# Patient Record
Sex: MALE | Race: BLACK OR AFRICAN AMERICAN | NOT HISPANIC OR LATINO | Employment: FULL TIME | URBAN - METROPOLITAN AREA
[De-identification: names, ages, dates, MRNs, and addresses within clinical notes are randomized per-mention and may not be internally consistent; named-entity substitution may affect disease eponyms.]

---

## 2023-10-21 PROBLEM — S86.011A ACHILLES TENDON RUPTURE, RIGHT, INITIAL ENCOUNTER: Status: ACTIVE | Noted: 2023-10-21

## 2023-10-21 PROBLEM — K40.90 REDUCIBLE RIGHT INGUINAL HERNIA: Status: ACTIVE | Noted: 2023-10-21

## 2023-10-21 PROBLEM — R30.0 DYSURIA: Status: ACTIVE | Noted: 2023-10-21

## 2023-10-21 RX ORDER — ASPIRIN 81 MG/1
81 TABLET ORAL DAILY
COMMUNITY

## 2023-10-21 RX ORDER — LISINOPRIL 30 MG/1
TABLET ORAL
COMMUNITY

## 2023-10-24 ENCOUNTER — EVALUATION (OUTPATIENT)
Dept: PHYSICAL THERAPY | Facility: CLINIC | Age: 43
End: 2023-10-24
Payer: OTHER MISCELLANEOUS

## 2023-10-24 DIAGNOSIS — S39.012A STRAIN OF MUSCLE, FASCIA AND TENDON OF LOWER BACK, INITIAL ENCOUNTER: Primary | ICD-10-CM

## 2023-10-24 DIAGNOSIS — S86.011D STRAIN OF ACHILLES TENDON, RIGHT, SUBSEQUENT ENCOUNTER: ICD-10-CM

## 2023-10-24 DIAGNOSIS — S86.912D STRAIN OF KNEE, LEFT, SUBSEQUENT ENCOUNTER: ICD-10-CM

## 2023-10-24 DIAGNOSIS — S39.012D BACK STRAIN, SUBSEQUENT ENCOUNTER: ICD-10-CM

## 2023-10-24 DIAGNOSIS — S86.011A STRAIN OF RIGHT ACHILLES TENDON, INITIAL ENCOUNTER: ICD-10-CM

## 2023-10-24 DIAGNOSIS — S83.92XA SPRAIN OF UNSPECIFIED SITE OF LEFT KNEE, INITIAL ENCOUNTER: ICD-10-CM

## 2023-10-24 DIAGNOSIS — S66.912A STRAIN OF UNSPECIFIED MUSCLE, FASCIA AND TENDON AT WRIST AND HAND LEVEL, LEFT HAND, INITIAL ENCOUNTER: ICD-10-CM

## 2023-10-24 PROCEDURE — 97110 THERAPEUTIC EXERCISES: CPT | Mod: GP

## 2023-10-24 PROCEDURE — 97161 PT EVAL LOW COMPLEX 20 MIN: CPT | Mod: GP

## 2023-10-24 PROCEDURE — 97140 MANUAL THERAPY 1/> REGIONS: CPT | Mod: GP

## 2023-10-24 ASSESSMENT — PATIENT HEALTH QUESTIONNAIRE - PHQ9
SUM OF ALL RESPONSES TO PHQ9 QUESTIONS 1 AND 2: 0
1. LITTLE INTEREST OR PLEASURE IN DOING THINGS: NOT AT ALL
2. FEELING DOWN, DEPRESSED OR HOPELESS: NOT AT ALL
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
## 2023-10-24 NOTE — Clinical Note
October 24, 2023     Patient: Elbert Tam   YOB: 1980   Date of Visit: 10/24/2023       To Whom It May Concern:    It is my medical opinion that Elbert Tam {Work release (duty restriction):89839}.    If you have any questions or concerns, please don't hesitate to call.         Sincerely,        Miranda Mondragon, PT    CC: No Recipients

## 2023-10-24 NOTE — Clinical Note
October 24, 2023     Patient: Elbert Tam   YOB: 1980   Date of Visit: 10/24/2023       To Whom it May Concern:    Elbert Tam was seen in my clinic on 10/24/2023. He {Return to school/sport:98041}.    If you have any questions or concerns, please don't hesitate to call.         Sincerely,          Miranda Mondragon, PT        CC: No Recipients

## 2023-10-26 ENCOUNTER — TREATMENT (OUTPATIENT)
Dept: PHYSICAL THERAPY | Facility: CLINIC | Age: 43
End: 2023-10-26
Payer: OTHER MISCELLANEOUS

## 2023-10-26 DIAGNOSIS — S86.011A STRAIN OF RIGHT ACHILLES TENDON, INITIAL ENCOUNTER: ICD-10-CM

## 2023-10-26 DIAGNOSIS — S66.912A STRAIN OF UNSPECIFIED MUSCLE, FASCIA AND TENDON AT WRIST AND HAND LEVEL, LEFT HAND, INITIAL ENCOUNTER: ICD-10-CM

## 2023-10-26 DIAGNOSIS — S86.912D STRAIN OF KNEE, LEFT, SUBSEQUENT ENCOUNTER: Primary | ICD-10-CM

## 2023-10-26 DIAGNOSIS — S39.012A STRAIN OF MUSCLE, FASCIA AND TENDON OF LOWER BACK, INITIAL ENCOUNTER: ICD-10-CM

## 2023-10-26 DIAGNOSIS — S83.92XA SPRAIN OF UNSPECIFIED SITE OF LEFT KNEE, INITIAL ENCOUNTER: ICD-10-CM

## 2023-10-26 DIAGNOSIS — S39.012D BACK STRAIN, SUBSEQUENT ENCOUNTER: ICD-10-CM

## 2023-10-26 DIAGNOSIS — S86.011D STRAIN OF ACHILLES TENDON, RIGHT, SUBSEQUENT ENCOUNTER: ICD-10-CM

## 2023-10-26 PROCEDURE — 97140 MANUAL THERAPY 1/> REGIONS: CPT | Mod: GP

## 2023-10-26 PROCEDURE — 97110 THERAPEUTIC EXERCISES: CPT | Mod: GP

## 2023-10-26 NOTE — PROGRESS NOTES
"Physical Therapy      Physical Therapy Treatment    Patient Name: Elbert Tam  MRN: [id number]  Today's Date: 10/26/2023  Time Calculation  Start Time: 1000  Stop Time: 1045  Time Calculation (min): 45 min    INSURANCE    Workers Comp  Visit #: 2  Approved # of Visits: 12  Cert Dates Start  ASAP  Cert Date End 11/15/23  Referred by KEYLA George     Current Problem  1. Strain of knee, left, subsequent encounter        2. Strain of muscle, fascia and tendon of lower back, initial encounter  Follow Up In Physical Therapy      3. Strain of right Achilles tendon, initial encounter  Follow Up In Physical Therapy      4. Sprain of unspecified site of left knee, initial encounter  Follow Up In Physical Therapy      5. Strain of unspecified muscle, fascia and tendon at wrist and hand level, left hand, initial encounter  Follow Up In Physical Therapy      6. Strain of Achilles tendon, right, subsequent encounter        7. Back strain, subsequent encounter            Assessment  Demonstrates decreased tenderness and TP throughout lumbar paraspinals and piriformis. Does note relief with manual therapy and a feeling of \"looseness.\" Improvements noted in right hip IR in prone as well. Significant weakness with right gastroc, with inability to complete SL heel raise. He also demonstrates decreased coordination throughout his core. Poor TA activation with verbal / tactile cues for engagement of TA. Fatigued with all exercises. No increase in pain with exercises.       PLAN  Continue with manual for symptom management. Flexibility for symptom management. Focus on TA / anterior / posterior core strength, hip strength, gastroc / soleus strength.     SUBJECTIVE    Reports that overall his back is feeling better. He is doing his exercises every day. Reports that he likes the stretches, and they seem to be loosening him up.     Pain:  Current: 4/10     Precautions: HTN, Achielles Repair 7/1/23  Fall risk - none      OBJECTIVE " "    Treatment  Manual 25 min   - STM to lumbar paraspinals  - pin and stretch left piriformis  - STM to achielles  - IASTM to achielles incision      Therapeutic Exercise 20 min   - calf stretch slant board 3 x 30\"  - B LE calf raise with SL (R) eccentric lower x 20   - SL Squat Shuttle 75 lbs (R) x 20   - SL Heel Raise Shuttle 50 lbs (R) x 20     - alt UE / LE over ball x 20     - LTR with ball squeeze w/ feet elevated x 20   - dead bug x 20 (cues to prevent doming of core)  - TA + SLR lower x 10 (cues to prevent doming of core)    "

## 2023-10-31 ENCOUNTER — TREATMENT (OUTPATIENT)
Dept: PHYSICAL THERAPY | Facility: CLINIC | Age: 43
End: 2023-10-31
Payer: OTHER MISCELLANEOUS

## 2023-10-31 DIAGNOSIS — S86.011D STRAIN OF ACHILLES TENDON, RIGHT, SUBSEQUENT ENCOUNTER: ICD-10-CM

## 2023-10-31 DIAGNOSIS — S86.912D STRAIN OF KNEE, LEFT, SUBSEQUENT ENCOUNTER: ICD-10-CM

## 2023-10-31 DIAGNOSIS — S39.012D BACK STRAIN, SUBSEQUENT ENCOUNTER: Primary | ICD-10-CM

## 2023-10-31 PROCEDURE — 97140 MANUAL THERAPY 1/> REGIONS: CPT | Mod: GP | Performed by: PHYSICAL THERAPIST

## 2023-10-31 PROCEDURE — 97110 THERAPEUTIC EXERCISES: CPT | Mod: GP | Performed by: PHYSICAL THERAPIST

## 2023-10-31 NOTE — PROGRESS NOTES
"Physical Therapy      Physical Therapy Treatment    Patient Name: Elbert Tam  MRN: [id number]  Today's Date: 10/31/2023  Time Calculation  Start Time: 0845  Stop Time: 0930  Time Calculation (min): 45 min    INSURANCE  Workers Comp  Visit #: 3  Approved # of Visits: 12  Cert Dates Start  ASAP  Cert Date End 11/15/23  Referred by KEYLA George     Current Problem  1. Back strain, subsequent encounter        2. Strain of Achilles tendon, right, subsequent encounter        3. Strain of knee, left, subsequent encounter              Assessment  Pt challenged with side stepping, monster walks, and dead bugs with notable fatigue in core and glutes. Pt tolerated manual well with decreased TTP in lumbar paraspinals and increased IR ROM during pin and stretch. Pt is appropriate for continued skilled PT services to address remaining impairments.      PLAN  Continue core, LE, and achilles strengthening. Progress as tolerated.   Consider jumping to build gastroc strength as pt was not aware he could resume to jumping.   Continue with manual and flexibility for sx management.    SUBJECTIVE    Pt states that he still feels he is improving. Was sore after last session, but states it was a good muscle soreness. Compliant with HEP.    Pain:  Current: 3/10     Precautions: HTN, Achielles Repair 7/1/2  Fall risk - none      OBJECTIVE     Treatment  Manual 15 min   - DTM and TPR to lumbar paraspinals  - pin and stretch left piriformis  - STM to achilles    Therapeutic Exercise 30 min   - SCIFIT L3 x 8\" seat 12   - side stepping green TB at ankles 25' x 3 R/L*  - monster walks green TB at ankles 25' x 3 fwd/bwd*  - calf stretch slant board 30\" x 3  - B LE calf raise with SL (R) eccentric lower x 10 x 2   - TA + BKFO x 10 R/L   - TA + alt marches x 10 R/L   - dead bug with SB x 10 R/L  - bird dog x 10 R/L  *added to HEP    Education  Pt educated on abdominal musculature anatomy and importance of TA strength for spinal " stability, resuming jumping to build up R gastroc strength, and HEP updated.

## 2023-11-02 ENCOUNTER — TREATMENT (OUTPATIENT)
Dept: PHYSICAL THERAPY | Facility: CLINIC | Age: 43
End: 2023-11-02
Payer: OTHER MISCELLANEOUS

## 2023-11-02 DIAGNOSIS — S39.012A STRAIN OF MUSCLE, FASCIA AND TENDON OF LOWER BACK, INITIAL ENCOUNTER: ICD-10-CM

## 2023-11-02 DIAGNOSIS — S83.92XA SPRAIN OF UNSPECIFIED SITE OF LEFT KNEE, INITIAL ENCOUNTER: ICD-10-CM

## 2023-11-02 DIAGNOSIS — S86.011A STRAIN OF RIGHT ACHILLES TENDON, INITIAL ENCOUNTER: ICD-10-CM

## 2023-11-02 DIAGNOSIS — S86.011D STRAIN OF ACHILLES TENDON, RIGHT, SUBSEQUENT ENCOUNTER: ICD-10-CM

## 2023-11-02 DIAGNOSIS — S39.012D BACK STRAIN, SUBSEQUENT ENCOUNTER: Primary | ICD-10-CM

## 2023-11-02 DIAGNOSIS — S86.912D STRAIN OF KNEE, LEFT, SUBSEQUENT ENCOUNTER: ICD-10-CM

## 2023-11-02 DIAGNOSIS — S66.912A STRAIN OF UNSPECIFIED MUSCLE, FASCIA AND TENDON AT WRIST AND HAND LEVEL, LEFT HAND, INITIAL ENCOUNTER: ICD-10-CM

## 2023-11-02 PROCEDURE — 97140 MANUAL THERAPY 1/> REGIONS: CPT | Mod: GP

## 2023-11-02 PROCEDURE — 97110 THERAPEUTIC EXERCISES: CPT | Mod: GP

## 2023-11-02 NOTE — PROGRESS NOTES
"Physical Therapy      Physical Therapy Treatment    Patient Name: Elbert Tam  MRN: [id number]  Today's Date: 11/2/2023       INSURANCE  Workers Comp  Visit #: 4  Approved # of Visits: 12  Cert Dates Start  ASAP  Cert Date End 11/15/23  Referred by KEYLA George     Current Problem  1. Back strain, subsequent encounter        2. Strain of Achilles tendon, right, subsequent encounter        3. Strain of knee, left, subsequent encounter        4. Strain of muscle, fascia and tendon of lower back, initial encounter  Follow Up In Physical Therapy      5. Strain of right Achilles tendon, initial encounter  Follow Up In Physical Therapy      6. Sprain of unspecified site of left knee, initial encounter  Follow Up In Physical Therapy      7. Strain of unspecified muscle, fascia and tendon at wrist and hand level, left hand, initial encounter  Follow Up In Physical Therapy              Assessment  Patient demonstrates improvements in his core strength. Improved coordination with TA. Able to progress core strengthening activities, with cues for left side plank for alignment. Demonstrated difficulty with foam roll coordination activities. He was also able to initiate jumping without any complaints of pain, but fatigue noted. Advised patient he can start some light jumping at home x 30\" increments. Decreased TP to lumbar parapsinals and continued improvements in his hip IR ROM. Continue to progress core strengthening and flexibility as well as gastroc strength as able.       PLAN  Continue core, LE, and achilles strengthening. Progress as tolerated.   Consider jumping to build gastroc strength as pt was not aware he could resume to jumping.   Continue with manual and flexibility for sx management.    SUBJECTIVE    Reports that he is feeling pretty good. He feels like his back is improving. He is consistently doing his exercises at home.  Reports that rolling in bed is getting easier, but he is still having some pain " "occasionally.     Pain:  Current: 3/10     Precautions: HTN, Achielles Repair 7/1/2  Fall risk - none      OBJECTIVE       Treatment  Manual 10 min   - DTM and TPR to lumbar paraspinals  - pin and stretch left piriformis    Therapeutic Exercise 35 min   - SCIFIT L3 x 6\" seat 12   - side stepping green TB at ankles 25' x 3 R/L  - monster walks green TB at ankles 25' x 3 fwd/bwd  - waddles green TB at ankles 25' x 3 fwd/bwd  - full foam roll alt UE / LE 20 x   - full foam roll B UE hover + LE march 20 x   - TA + BKFO x 10 R/L   - TA + alt marches x 10 R/L   - down dog x 10\" - with alt LE ext to stretch achielles *   - emily pose x 30\" *  - emily pose + r/l SB x 30\"  - dead bug with SB x 10 R/L *  - modified side plank + clam 10 x ea *  - bird dog x 10 R/L *  - jumping shuttle 30 lbs 40 x   - jumping 2 x 30\" * (jump rope at home 3 -5 x 20-30\")    -*added to HEP    Education  Pt educated on jumping / running progression and ability to complete SL heel raise before resuming running. Educated regarding yoga   "

## 2023-11-06 ENCOUNTER — TREATMENT (OUTPATIENT)
Dept: PHYSICAL THERAPY | Facility: CLINIC | Age: 43
End: 2023-11-06
Payer: OTHER MISCELLANEOUS

## 2023-11-06 DIAGNOSIS — S86.011A STRAIN OF RIGHT ACHILLES TENDON, INITIAL ENCOUNTER: ICD-10-CM

## 2023-11-06 DIAGNOSIS — S66.912A STRAIN OF UNSPECIFIED MUSCLE, FASCIA AND TENDON AT WRIST AND HAND LEVEL, LEFT HAND, INITIAL ENCOUNTER: ICD-10-CM

## 2023-11-06 DIAGNOSIS — S83.92XA SPRAIN OF UNSPECIFIED SITE OF LEFT KNEE, INITIAL ENCOUNTER: ICD-10-CM

## 2023-11-06 DIAGNOSIS — S39.012A STRAIN OF MUSCLE, FASCIA AND TENDON OF LOWER BACK, INITIAL ENCOUNTER: ICD-10-CM

## 2023-11-06 PROCEDURE — 97140 MANUAL THERAPY 1/> REGIONS: CPT | Mod: GP,CQ

## 2023-11-06 PROCEDURE — 97110 THERAPEUTIC EXERCISES: CPT | Mod: GP,CQ

## 2023-11-06 NOTE — PROGRESS NOTES
"Physical Therapy      Physical Therapy Treatment    Patient Name: Elbert Tam  MRN: [id number]  Today's Date: 11/6/2023    Time Calculation  Start Time: 1135  Stop Time: 1220  Time Calculation (min): 45 min    INSURANCE  Workers Comp  Visit #: 5  Approved # of Visits: 12  Cert Dates Start  ASAP  Cert Date End 11/15/23  Referred by KEYLA George     Current Problem  1. Strain of muscle, fascia and tendon of lower back, initial encounter  Follow Up In Physical Therapy      2. Strain of right Achilles tendon, initial encounter  Follow Up In Physical Therapy      3. Sprain of unspecified site of left knee, initial encounter  Follow Up In Physical Therapy      4. Strain of unspecified muscle, fascia and tendon at wrist and hand level, left hand, initial encounter  Follow Up In Physical Therapy              Assessment:  Patient presented to session with mild pain of 3/10 in the R Achilles. Performed double leg shuttle jumping with verbal cues for equal weight bearing through the B LEs without pain; introduced SL jumping on shuttle with low resistance using proper form with no pain. Able to begin SL hip hinges to challenge LE balance strategies with some minimal irritability in the R LS. Manual intervention to the R LS region improved soft tissue mobility post session. Updated HEP to include SL hip hinges and jump rope.      PLAN:  Will trial jump rope at home.  Continue core, LE, and achilles strengthening. Progress as tolerated.   Continue with manual and flexibility for sx management.    SUBJECTIVE:  Reports that he is getting better    Pain:  Current: 3/10     Precautions: HTN, Achilles Repair 7/1/23  Fall risk - none    OBJECTIVE     Treatment  Manual 8 min   - DTM and TPR to lumbar paraspinals  - pin and stretch left piriformis NV    Therapeutic Exercise 37 min   - SCIFIT L3 x 6\" seat 12   - slant board stretch 30\" x 2  - side stepping green TB at ankles 25' x 2 R/L  - monster walks green TB at ankles 25' x " "2 fwd/bwd  - waddles green TB at ankles 25' x 2 fwd/bwd  - full foam roll alt UE / LE 20 x   - full foam roll B UE hover + LE march 20 x   - TA + BKFO x 10 R/L NV  - TA + alt marches x 10 R/L NV  - down dog x 10\" - with alt LE ext to stretch Achilles NV  - emily pose at bar 2 x 30\"   - emily pose + r/l SB 5-10\" x 10 each side  - dead bug with SB x 10 R/L   - modified side plank + clam 10 x ea NV  - bird dog on SB x 10 R/L   - jumping shuttle DL 37 lbs 40 x , SL 18# 20x ea  - jumping 2 x 30\" * (jump rope at home 3 -5 x 20-30\")  - SL hip hinge to small cone x 10 R/L *  -*added to HEP    Education  Exercise technique, postural awareness, exercise progression   "

## 2023-11-09 ENCOUNTER — TREATMENT (OUTPATIENT)
Dept: PHYSICAL THERAPY | Facility: CLINIC | Age: 43
End: 2023-11-09
Payer: OTHER MISCELLANEOUS

## 2023-11-09 DIAGNOSIS — S86.011A STRAIN OF RIGHT ACHILLES TENDON, INITIAL ENCOUNTER: ICD-10-CM

## 2023-11-09 DIAGNOSIS — S86.011D STRAIN OF ACHILLES TENDON, RIGHT, SUBSEQUENT ENCOUNTER: ICD-10-CM

## 2023-11-09 DIAGNOSIS — S39.012D BACK STRAIN, SUBSEQUENT ENCOUNTER: ICD-10-CM

## 2023-11-09 DIAGNOSIS — S86.912D STRAIN OF KNEE, LEFT, SUBSEQUENT ENCOUNTER: Primary | ICD-10-CM

## 2023-11-09 PROBLEM — S66.912A: Status: ACTIVE | Noted: 2023-11-09

## 2023-11-09 PROBLEM — S39.012A STRAIN OF MUSCLE, FASCIA AND TENDON OF LOWER BACK, INITIAL ENCOUNTER: Status: ACTIVE | Noted: 2023-11-09

## 2023-11-09 PROBLEM — S83.92XA SPRAIN OF UNSPECIFIED SITE OF LEFT KNEE, INITIAL ENCOUNTER: Status: ACTIVE | Noted: 2023-11-09

## 2023-11-09 PROCEDURE — 97110 THERAPEUTIC EXERCISES: CPT | Mod: GP

## 2023-11-09 PROCEDURE — 97140 MANUAL THERAPY 1/> REGIONS: CPT | Mod: GP

## 2023-11-09 NOTE — PROGRESS NOTES
"Physical Therapy      Physical Therapy Treatment    Patient Name: Elbert Tam  MRN: [id number]  Today's Date: 11/9/2023    Time Calculation  Start Time: 1130  Stop Time: 1215  Time Calculation (min): 45 min    INSURANCE  Workers Comp  Visit #: 6  Approved # of Visits: 12  Cert Dates Start  ASAP  Cert Date End 11/15/23  Referred by KEYLA George     Current Problem  1. Strain of knee, left, subsequent encounter        2. Strain of right Achilles tendon, initial encounter  Follow Up In Physical Therapy      3. Strain of Achilles tendon, right, subsequent encounter        4. Back strain, subsequent encounter                Assessment:  Presents with continued improvements in his back and leg pain. Able to progress to squatting activities without increased pain during today's session. Also able to initiate chops and lifts, with some apprehension with twisting motion, however was able to complete without pain. Verbal cues for core engagement. Continues to demonstrate overall improvements in his tolerance to activity and decreased pain. Recommend continue to progress strengthening and functional movement patterns as tolerated to allow him to return to full duty.       PLAN:  Continue core, LE, and achilles strengthening. Progress as tolerated.   Continue with manual and flexibility for sx management.    SUBJECTIVE:  Reports that he is getting better. Reports that yesterday he had 3/10 pain after raking some leaves. Reports that he has been jumping rope at home for short periods of time without any pain. Overall feels like his back is improving.     Pain:  Current: 0/10     Precautions: HTN, Achilles Repair 7/1/23  Fall risk - none    OBJECTIVE     Treatment  Manual 8 min   - DTM and TPR to lumbar paraspinals      Therapeutic Exercise 37 min   - SCIFIT L3 x 6\" seat 12   - slant board stretch 30\" x 2  - full foam roll alt UE / LE 20 x   - full foam roll B UE hover + LE march 20 x   - plank 3 x 30\"  - jumping " "shuttle DL 37 lbs 40 x , SL 18# 20x ea  - jumping 2 x 30\" * (jump rope at home 3 -5 x 20-30\")  - SL hip hinge to small cone  2 x 10 R/L *  - chops 22.5 lbs 2 x 10   - lifts 17.5 lbs 2 x 10   - squats 10 lbs 2 x 15  - side step with anti rotation press  17.5 lbs x 3 ea   - 1/2 kneeling hip flexor stretch 3 x 30\" ea  -*added to HEP    Education  Exercise technique, postural awareness, exercise progression   "

## 2023-11-13 ENCOUNTER — TREATMENT (OUTPATIENT)
Dept: PHYSICAL THERAPY | Facility: CLINIC | Age: 43
End: 2023-11-13
Payer: OTHER MISCELLANEOUS

## 2023-11-13 DIAGNOSIS — S86.011A STRAIN OF RIGHT ACHILLES TENDON, INITIAL ENCOUNTER: ICD-10-CM

## 2023-11-13 DIAGNOSIS — S39.012A STRAIN OF BACK: Primary | ICD-10-CM

## 2023-11-13 PROCEDURE — 97110 THERAPEUTIC EXERCISES: CPT | Mod: GP,CQ

## 2023-11-13 NOTE — PROGRESS NOTES
"Physical Therapy      Physical Therapy Treatment    Patient Name: Elbert Tam  MRN: [id number]  Today's Date: 11/13/2023    Time Calculation  Start Time: 1135  Stop Time: 1228  Time Calculation (min): 53 min    INSURANCE  Workers Comp  Visit #: 7  Approved # of Visits: 12  Cert Dates Start  ASAP  Cert Date End: 11/15/23  Referred by KEYLA George     Current Problem  1. Strain of back        2. Strain of right Achilles tendon, initial encounter  Follow Up In Physical Therapy          Assessment:  Patient presented to session with more soreness this date vs pain in the R achilles and LS. Reviewed cable column chop technique and appropriate postural awareness, including core stability during movement without pain. Is beginning to feel equal weight bearing during resisted shuttle jumping when performed bilaterally; SL shuttle jumping performed without pain. No c/o pain post treatment, expresses he is usually sore later in the day or the next day.    PLAN:  **Patient will need a date extension beyond 11/15/23.**  Continue core, LE, and achilles strengthening. Progress as tolerated.   Continue with manual and flexibility for sx management.    SUBJECTIVE:  Reports that he has mostly soreness in the R achilles and the back.    Pain:  Current: 0/10     Precautions: HTN, Achilles Repair 7/1/23  Fall risk - none    OBJECTIVE     Treatment    Manual:  - DTM and TPR to lumbar paraspinals // NP    Therapeutic Exercise  min   - SCIFIT L3 x 8\" seat 12   - slant board stretch 30\" x 2  - full foam roll alt UE / LE 20 x   - full foam roll B UE hover + LE march 20 x   - plank 3 x 30\"  - jumping shuttle DL 37 lbs 40 x , SL 18# 20x ea  - jumping 2 x 30\" (jump rope at home 3 -5 x 20-30\")  - SL hip hinge to small cone  2 x 10 R/L  - chops 22.5 lbs 2 x 10   - lifts 27.5 lbs 2 x 10   - squats 10 lbs 2 x 15  - side step with rotation press 17.5 lbs 2 x 10 ea   - 1/2 kneeling hip flexor stretch 3 x 30\" ea    Education  Exercise " technique, postural awareness, exercise progression

## 2023-11-15 ENCOUNTER — TREATMENT (OUTPATIENT)
Dept: PHYSICAL THERAPY | Facility: CLINIC | Age: 43
End: 2023-11-15
Payer: OTHER MISCELLANEOUS

## 2023-11-15 DIAGNOSIS — S86.011A STRAIN OF RIGHT ACHILLES TENDON, INITIAL ENCOUNTER: ICD-10-CM

## 2023-11-15 PROCEDURE — 97110 THERAPEUTIC EXERCISES: CPT | Mod: GP,CQ

## 2023-11-15 NOTE — PROGRESS NOTES
"Physical Therapy      Physical Therapy Treatment    Patient Name: Elbert Tam  MRN: [id number]  Today's Date: 11/15/2023    Time Calculation  Start Time: 1000  Stop Time: 1045  Time Calculation (min): 45 min    INSURANCE  Workers Comp  Visit #: 8  Approved # of Visits: 12  Cert Dates Start  ASAP  Cert Date End: 11/15/23  Referred by KEYLA George     Current Problem  1. Strain of right Achilles tendon, initial encounter  Follow Up In Physical Therapy          Assessment:  Patient presented to session with no c/o pain in the R achilles and LS. Displayed improved core stability while performing foam roll exercises for the UE and LE. Able to increase shuttle jumping weight without pain, continues to improve with equal weight bearing during double limb jumping. Added child's pose at bar to improve LS flexibility. Introduced 5# free weight to SL hip hinges to challenge balance strategies and core/LS strength with some difficulty. No c/o pain in the LS nor R Achilles post session.    PLAN:    **Today is last cover day. Patient will need a date extension beyond 11/15/23. Going to MD for follow up tomorrow.**    SUBJECTIVE:  Reports that he was sore the next day after last session; today the R achilles and the back are doing well. Has follow up MD appointment     Pain:  Current: 0/10     Precautions: HTN, Achilles Repair 7/1/23  Fall risk - none    OBJECTIVE     Treatment    Manual:  - DTM and TPR to lumbar paraspinals // NP    Therapeutic Exercise  45 min   - SCIFIT L4 hills x 8\" seat 12   - slant board stretch 30\" x 2  - full foam roll alt UE / LE 2 x 20  - full foam roll B UE hover + LE march 20 x   - plank 3 x 30\"  - jumping shuttle DL 43 lbs 2 x 20; SL 25 lbs 2 x 20  - child's pose at bar 30\" x 3  - jumping 2 x 30\" (jump rope at home 3 -5 x 20-30\")  - SL hip hinge with 5# 2 x 10 R/L  - chops 22.5 lbs 2 x 10   - lifts 27.5 lbs 2 x 10   - side step with rotation press 17.5 lbs 2 x 10 ea   - goblet squats to " "black box 10 lbs 2 x 10  - 1/2 kneeling hip flexor stretch 3 x 30\" ea    Education  Exercise technique, postural awareness, exercise progression   "

## 2023-11-21 ENCOUNTER — TREATMENT (OUTPATIENT)
Dept: PHYSICAL THERAPY | Facility: CLINIC | Age: 43
End: 2023-11-21
Payer: OTHER MISCELLANEOUS

## 2023-11-21 DIAGNOSIS — S86.011A STRAIN OF RIGHT ACHILLES TENDON, INITIAL ENCOUNTER: ICD-10-CM

## 2023-11-21 PROCEDURE — 97110 THERAPEUTIC EXERCISES: CPT | Mod: GP

## 2023-11-21 NOTE — PROGRESS NOTES
"Physical Therapy    Physical Therapy Treatment    Patient Name: Elbert Tam  MRN: [id number]  Today's Date: 11/21/2023    Time Calculation  Start Time: 1000  Stop Time: 1100  Time Calculation (min): 60 min    INSURANCE  Workers Comp  Visit #: 9  Approved # of Visits: 12  Cert Dates Start  ASAP  Cert Date End: 12/10/23  Referred by KEYLA George     Current Problem  1. Strain of right Achilles tendon, initial encounter  Follow Up In Physical Therapy          Assessment:  Able to tolerate progression of all core strengthening activities without any complaints of pain. Required verbal cues for correct form during planks and to prevent compensatory patterns . Also required verbal cues to engage TA during foam roll abdominal activities. He was fatigued with lunging activities, with cues for bringing his shoulders over his hips to prevent forward trunk lean. Able to complete all twisting activities without any pain. Patient is required to be able to complete lifting, pushing, and pulling activities to return to work.      PLAN:  Pushing, pulling, twisting activities for return to work.       SUBJECTIVE:  Reports that his back and Achilles are feeling better. He reports that he only has pain with certain movements, here and there but overall is feeling much better. He is consistently completing his home program. Feels like he is getting stronger.   Extended his date extension to 12/10/23.    Pain:  Current: 0/10     Precautions: HTN, Achilles Repair 7/1/23  Fall risk - none    OBJECTIVE     Treatment    Manual:  - DTM and TPR to lumbar paraspinals // NP    Therapeutic Exercise  45 min   - SCIFIT L4 hills x 8\" seat 12   - slant board stretch 30\" x 2  - full foam roll alt UE / LE 2 x 20  - full foam roll B UE hover + LE march 20 x   - plank with shoulder tap 2 x 20\"  - plank with alt hip ext 2 x 20\"   - jumping shuttle DL 50 lbs 4 x 30\"; SL 25 lbs 3 x 30\" ea  - SL hip hinge with 5# each UE  2 x 10 R/L  - chops 22.5 " "lbs 2 x 10   - lifts 27.5 lbs 2 x 10   - alt backwards lunge with overhead press 5 lbs ea 2 x 10   - walking lunge with trunk rotation 5 lbs 2 x 10   - side step with rotation press 17.5 lbs 2 x 10 ea   - bird dog to elbow to knee  2 x 10 ea   - goblet squats to black box 10 lbs 2 x 10  - 1/2 kneeling hip flexor stretch 3 x 30\" ea  - emily pose , neutral, right, left 3 x 30\"  - single knee to chest, with opp hip flexor stretch 2 x 30\"  - lower trunk rotation stretch 2 x 30\"     Education  Exercise technique, postural awareness, exercise progression   "

## 2023-11-30 ENCOUNTER — TREATMENT (OUTPATIENT)
Dept: PHYSICAL THERAPY | Facility: CLINIC | Age: 43
End: 2023-11-30
Payer: OTHER MISCELLANEOUS

## 2023-11-30 DIAGNOSIS — S86.011A STRAIN OF RIGHT ACHILLES TENDON, INITIAL ENCOUNTER: ICD-10-CM

## 2023-11-30 PROCEDURE — 97110 THERAPEUTIC EXERCISES: CPT | Mod: GP | Performed by: PHYSICAL THERAPIST

## 2023-11-30 NOTE — PROGRESS NOTES
"Physical Therapy    Physical Therapy Treatment    Patient Name: Elbert Tam  MRN: [id number]  Today's Date: 11/30/2023  Time in/out:10:50-11:40     INSURANCE  Workers Comp  Visit #: 10  Approved # of Visits: 12  Cert Dates Start  ASAP  Cert Date End: 12/10/23  Referred by KEYLA George      Current Problem  1. Strain of right Achilles tendon, initial encounter  Follow Up In Physical Therapy       Assessment:  Pt holger new ex w/o incident and did experience inc muscle fatigue in B hips.Pt was able to holger all activities w/o c/o's of pain.     Plan:   Pushing, pulling, twisting activities for return to work.              Subjective      Pt states not really any pain today. Pt states no new c/o's since last visit.Pt states he will see MD in 2 weeks for follow up      Pain  0/10     Objective      Treatments:   Therapeutic Exercise  50 min   - SCIFIT L4 hills x 8\" seat 12   - slant board stretch 30\" x 2  - full foam roll alt UE / LE 2 x 20  - full foam roll B UE hover + LE march 20 x   - plank with shoulder tap 2 x 20\"  - plank with alt hip ext 2 x 20\"   - jumping shuttle DL 50 lbs  SL 25 lbs NP  - SL hip hinge with 5# each UE   NP  - chops 22.5 lbs 2 x 10   - lifts 27.5 lbs 2 x 10   - alt backwards lunge with overhead press 5 lbs ea 2 x 10   - walking lunge with trunk rotation 5 lbs 2 x 10   - side step with rotation press 17.5 lbs 2 x 10 ea   - bird dog to elbow to knee  NP  - goblet squats to black box 10 lbs 2 x 10  - 1/2 kneeling hip flexor stretch 3 x 30\" ea  - emily pose , neutral, right, left 3 x 30\"  - single knee to chest, with opp hip flexor stretch 2 x 30\"  - lower trunk rotation stretch 2 x 30\"   -Resistive amb 4 way matrix 27.5 lbs      EDUCATION:   Exercise technique, postural awareness, exercise progression        "

## 2023-12-05 ENCOUNTER — TREATMENT (OUTPATIENT)
Dept: PHYSICAL THERAPY | Facility: CLINIC | Age: 43
End: 2023-12-05
Payer: OTHER MISCELLANEOUS

## 2023-12-05 DIAGNOSIS — S86.912D STRAIN OF KNEE, LEFT, SUBSEQUENT ENCOUNTER: Primary | ICD-10-CM

## 2023-12-05 DIAGNOSIS — S86.011A STRAIN OF RIGHT ACHILLES TENDON, INITIAL ENCOUNTER: ICD-10-CM

## 2023-12-05 DIAGNOSIS — S39.012D BACK STRAIN, SUBSEQUENT ENCOUNTER: ICD-10-CM

## 2023-12-05 PROCEDURE — 97110 THERAPEUTIC EXERCISES: CPT | Mod: GP

## 2023-12-05 NOTE — PROGRESS NOTES
"Physical Therapy    Physical Therapy    Physical Therapy Treatment    Patient Name: Elbert Tam  MRN: [id number]  Today's Date: 12/5/2023  Time in/out: 12:20pm - 13:10 pn      INSURANCE  Workers Comp  Visit #: 11  Approved # of Visits: 12  Cert Dates Start  ASAP  Cert Date End: 12/10/23  Referred by KEYLA George      Current Problem    1. Strain of knee, left, subsequent encounter          2. Strain of right Achilles tendon, initial encounter  Follow Up In Physical Therapy       3. Strain of Achilles tendon, right, subsequent encounter          4. Back strain, subsequent encounter         Assessment:  Able to tolerate all resisted pushing, pulling, and lunging in multiple directions without any complaints of pain. He demonstrates fatigue with all activities. Able to tolerate multi directional lunging without difficulty. Plan to dc to independent HEP next session.      Plan:   Pushing, pulling, twisting activities for return to work.  Plan to discharge to independent HEP following next visit.         Subjective      Reports that overall his is feeling pretty good. Reports he is not really having back pain.       Pain  0/10     Objective      Treatments:   Therapeutic Exercise  50 min   - SCIFIT L4 hills x 8\" seat 12   - jumping in place x 30\"  - lateral, forward diagonal bounding 2 x 30\" ea  - slant board stretch 30\" x 2  - full foam roll alt UE / LE 2 x 20 NP  - full foam roll B UE hover + LE march 20 x  NP  - plank with shoulder tap 2 x 20\" NP  - plank with alt hip ext 2 x 20\"  NP  - SL hip hinge with 6# each UE     - chops 22.5 lbs 2 x 10   - lifts 27.5 lbs 2 x 10   - alt backwards lunge with overhead press 5 lbs ea 2 x 10   - walking lunge with trunk rotation 5 lbs 2 x 10   - side step with rotation press 17.5 lbs 2 x 10 ea   - airex SL balance with arch with slider   - bird dog to elbow to knee  NP  - goblet squats to black box 10 lbs 2 x 10 NP  - 1/2 kneeling hip flexor stretch 3 x 30\" ea  - emily " "pose , neutral, right, left 3 x 30\" NP  - single knee to chest, with opp hip flexor stretch 2 x 30\" NP  - lower trunk rotation stretch 2 x 30\"  NP  -Resistive amb 4 way matrix 27.5 lbs 5 x ea   - Resistive lunge 4 way matrix 22.5 lbs x 10 ea LE ea direction     NP: not performed on this date       EDUCATION:   Exercise technique, postural awareness, exercise progression        "

## 2023-12-06 ENCOUNTER — TREATMENT (OUTPATIENT)
Dept: PHYSICAL THERAPY | Facility: CLINIC | Age: 43
End: 2023-12-06
Payer: OTHER MISCELLANEOUS

## 2023-12-06 DIAGNOSIS — S39.012A STRAIN OF MUSCLE, FASCIA AND TENDON OF LOWER BACK, INITIAL ENCOUNTER: Primary | ICD-10-CM

## 2023-12-06 DIAGNOSIS — S66.912A STRAIN OF UNSPECIFIED MUSCLE, FASCIA AND TENDON AT WRIST AND HAND LEVEL, LEFT HAND, INITIAL ENCOUNTER: ICD-10-CM

## 2023-12-06 DIAGNOSIS — S86.011A STRAIN OF RIGHT ACHILLES TENDON, INITIAL ENCOUNTER: ICD-10-CM

## 2023-12-06 DIAGNOSIS — S83.92XA SPRAIN OF UNSPECIFIED SITE OF LEFT KNEE, INITIAL ENCOUNTER: ICD-10-CM

## 2023-12-06 PROCEDURE — 97535 SELF CARE MNGMENT TRAINING: CPT | Mod: GP

## 2023-12-06 PROCEDURE — 97110 THERAPEUTIC EXERCISES: CPT | Mod: GP

## 2023-12-06 NOTE — PROGRESS NOTES
"Physical Therapy -DISCHARGE    Physical Therapy    Physical Therapy Treatment    Patient Name: Elbert Tam  MRN: [id number]  Today's Date: 12/6/2023  Time in/out: 12:20pm - 13:10 pn     Time Calculation  Start Time: 1133  Stop Time: 1206  Time Calculation (min): 33 min  INSURANCE  Workers Comp  Visit #: 12  Approved # of Visits: 12  Cert Dates Start  ASAP  Cert Date End: 12/10/23  Referred by KEYLA George      Current Problem    1. Strain of knee, left, subsequent encounter          2. Strain of right Achilles tendon, initial encounter  Follow Up In Physical Therapy       3. Strain of Achilles tendon, right, subsequent encounter          4. Back strain, subsequent encounter         Assessment:  Pt has made significant progress in performance. He is more limited by previous achilles repair than by presenting problem. He is safe to return to [facility].     Plan:  All patient's questions were answered and will discharge and follow up if needed.           Subjective      Patient rated some soreness from yesterday, otherwise rated 0/10. No complaints. Returning to work as soon as indicated. He has been running 5 minutes at a time.     Pain  0/10     Objective   LEFS 69/80     AROM:  Spinal motion WFL in all planes     PROM/Joint Mobility:  Hip mobility WFL R/L    Strength:  Hip abduction 5/5 B  Hip extension 5/5 B   R gastroc and soleus 3/5    Palpation:  No significant trigger points in hip or SI joint     SL stance:   WFL R and L - no evidence of stepping strategy or trendelenberg    Treatments:  12-6-23  B squat to HR   SL HR   HR 50# on shuttle RLE   Shuttle jumping R/L      Therapeutic Exercise  50 min   - SCIFIT L4 hills x 8\" seat 12   - jumping in place x 30\"  - lateral, forward diagonal bounding 2 x 30\" ea  - slant board stretch 30\" x 2  - full foam roll alt UE / LE 2 x 20 NP  - full foam roll B UE hover + LE march 20 x  NP  - plank with shoulder tap 2 x 20\" NP  - plank with alt hip ext 2 x " "20\"  NP  - SL hip hinge with 6# each UE     - chops 22.5 lbs 2 x 10   - lifts 27.5 lbs 2 x 10   - alt backwards lunge with overhead press 5 lbs ea 2 x 10   - walking lunge with trunk rotation 5 lbs 2 x 10   - side step with rotation press 17.5 lbs 2 x 10 ea   - airex SL balance with arch with slider   - bird dog to elbow to knee  NP  - goblet squats to black box 10 lbs 2 x 10 NP  - 1/2 kneeling hip flexor stretch 3 x 30\" ea  - emily pose , neutral, right, left 3 x 30\" NP  - single knee to chest, with opp hip flexor stretch 2 x 30\" NP  - lower trunk rotation stretch 2 x 30\"  NP  -Resistive amb 4 way matrix 27.5 lbs 5 x ea   - Resistive lunge 4 way matrix 22.5 lbs x 10 ea LE ea direction     NP: not performed on this date       EDUCATION:   Exercise technique, postural awareness, exercise progression      Goals:  1. Pt will be independent in HEP in 6-8 weeks focused on ROM, strength, flexibility, and balance for self management of symptoms and prevent readmission for same condition. MET  2. Pt will demonstrate 5/5 left hip abduction strength to return to lifting, stairs in 6-8 weeks MET  3. Pt will demonstrate full lumbar spine flexion ROM to 100 improve forward bending activities  in 6-8 weeks. MET  4. Pt will report LEFS score 60/80 in 6-8 weeks to demonstrate functional improvement. MET  5. Pt will demonstrate ability to complete 10 SL heel raises on left to indicate safety with returning to lifting, carrying activities.  PARTIALLY MET          "

## 2025-04-29 ENCOUNTER — APPOINTMENT (OUTPATIENT)
Dept: RADIOLOGY | Facility: HOSPITAL | Age: 45
End: 2025-04-29
Payer: COMMERCIAL

## 2025-04-29 ENCOUNTER — HOSPITAL ENCOUNTER (EMERGENCY)
Facility: HOSPITAL | Age: 45
Discharge: HOME | End: 2025-04-29
Attending: INTERNAL MEDICINE
Payer: COMMERCIAL

## 2025-04-29 ENCOUNTER — APPOINTMENT (OUTPATIENT)
Dept: CARDIOLOGY | Facility: HOSPITAL | Age: 45
End: 2025-04-29
Payer: COMMERCIAL

## 2025-04-29 VITALS
BODY MASS INDEX: 27.48 KG/M2 | SYSTOLIC BLOOD PRESSURE: 121 MMHG | OXYGEN SATURATION: 97 % | RESPIRATION RATE: 17 BRPM | DIASTOLIC BLOOD PRESSURE: 87 MMHG | HEART RATE: 85 BPM | WEIGHT: 197 LBS | TEMPERATURE: 98.4 F

## 2025-04-29 DIAGNOSIS — I25.10 CORONARY ARTERY CALCIFICATION: Primary | ICD-10-CM

## 2025-04-29 DIAGNOSIS — K76.9 LIVER LESION: ICD-10-CM

## 2025-04-29 DIAGNOSIS — K59.00 CONSTIPATION, UNSPECIFIED CONSTIPATION TYPE: ICD-10-CM

## 2025-04-29 DIAGNOSIS — K55.069 OMENTAL INFARCTION (MULTI): ICD-10-CM

## 2025-04-29 LAB
ALBUMIN SERPL BCP-MCNC: 4.9 G/DL (ref 3.4–5)
ALP SERPL-CCNC: 58 U/L (ref 33–120)
ALT SERPL W P-5'-P-CCNC: 38 U/L (ref 10–52)
ANION GAP SERPL CALC-SCNC: 11 MMOL/L (ref 10–20)
APPEARANCE UR: CLEAR
AST SERPL W P-5'-P-CCNC: 24 U/L (ref 9–39)
BASOPHILS # BLD AUTO: 0.06 X10*3/UL (ref 0–0.1)
BASOPHILS NFR BLD AUTO: 0.7 %
BILIRUB SERPL-MCNC: 0.6 MG/DL (ref 0–1.2)
BILIRUB UR STRIP.AUTO-MCNC: NEGATIVE MG/DL
BUN SERPL-MCNC: 18 MG/DL (ref 6–23)
CALCIUM SERPL-MCNC: 10.2 MG/DL (ref 8.6–10.3)
CARDIAC TROPONIN I PNL SERPL HS: <3 NG/L (ref 0–20)
CARDIAC TROPONIN I PNL SERPL HS: <3 NG/L (ref 0–20)
CHLORIDE SERPL-SCNC: 103 MMOL/L (ref 98–107)
CO2 SERPL-SCNC: 26 MMOL/L (ref 21–32)
COLOR UR: YELLOW
CREAT SERPL-MCNC: 1.16 MG/DL (ref 0.5–1.3)
EGFRCR SERPLBLD CKD-EPI 2021: 80 ML/MIN/1.73M*2
EOSINOPHIL # BLD AUTO: 0.03 X10*3/UL (ref 0–0.7)
EOSINOPHIL NFR BLD AUTO: 0.4 %
ERYTHROCYTE [DISTWIDTH] IN BLOOD BY AUTOMATED COUNT: 12.7 % (ref 11.5–14.5)
GLUCOSE SERPL-MCNC: 154 MG/DL (ref 74–99)
GLUCOSE UR STRIP.AUTO-MCNC: NORMAL MG/DL
HCT VFR BLD AUTO: 48.6 % (ref 41–52)
HGB BLD-MCNC: 15.9 G/DL (ref 13.5–17.5)
IMM GRANULOCYTES # BLD AUTO: 0.02 X10*3/UL (ref 0–0.7)
IMM GRANULOCYTES NFR BLD AUTO: 0.2 % (ref 0–0.9)
KETONES UR STRIP.AUTO-MCNC: NEGATIVE MG/DL
LACTATE SERPL-SCNC: 1.3 MMOL/L (ref 0.4–2)
LEUKOCYTE ESTERASE UR QL STRIP.AUTO: NEGATIVE
LYMPHOCYTES # BLD AUTO: 3.11 X10*3/UL (ref 1.2–4.8)
LYMPHOCYTES NFR BLD AUTO: 37.9 %
MCH RBC QN AUTO: 29.6 PG (ref 26–34)
MCHC RBC AUTO-ENTMCNC: 32.7 G/DL (ref 32–36)
MCV RBC AUTO: 90 FL (ref 80–100)
MONOCYTES # BLD AUTO: 0.54 X10*3/UL (ref 0.1–1)
MONOCYTES NFR BLD AUTO: 6.6 %
NEUTROPHILS # BLD AUTO: 4.44 X10*3/UL (ref 1.2–7.7)
NEUTROPHILS NFR BLD AUTO: 54.2 %
NITRITE UR QL STRIP.AUTO: NEGATIVE
NRBC BLD-RTO: 0 /100 WBCS (ref 0–0)
PH UR STRIP.AUTO: 5.5 [PH]
PLATELET # BLD AUTO: 290 X10*3/UL (ref 150–450)
POTASSIUM SERPL-SCNC: 4.4 MMOL/L (ref 3.5–5.3)
PROT SERPL-MCNC: 8.1 G/DL (ref 6.4–8.2)
PROT UR STRIP.AUTO-MCNC: NEGATIVE MG/DL
RBC # BLD AUTO: 5.38 X10*6/UL (ref 4.5–5.9)
RBC # UR STRIP.AUTO: NEGATIVE MG/DL
SODIUM SERPL-SCNC: 136 MMOL/L (ref 136–145)
SP GR UR STRIP.AUTO: 1.04
UROBILINOGEN UR STRIP.AUTO-MCNC: NORMAL MG/DL
WBC # BLD AUTO: 8.2 X10*3/UL (ref 4.4–11.3)

## 2025-04-29 PROCEDURE — 36415 COLL VENOUS BLD VENIPUNCTURE: CPT | Performed by: INTERNAL MEDICINE

## 2025-04-29 PROCEDURE — 2550000001 HC RX 255 CONTRASTS: Performed by: EMERGENCY MEDICINE

## 2025-04-29 PROCEDURE — 84484 ASSAY OF TROPONIN QUANT: CPT | Performed by: INTERNAL MEDICINE

## 2025-04-29 PROCEDURE — 74177 CT ABD & PELVIS W/CONTRAST: CPT | Performed by: STUDENT IN AN ORGANIZED HEALTH CARE EDUCATION/TRAINING PROGRAM

## 2025-04-29 PROCEDURE — 81003 URINALYSIS AUTO W/O SCOPE: CPT | Performed by: EMERGENCY MEDICINE

## 2025-04-29 PROCEDURE — 80053 COMPREHEN METABOLIC PANEL: CPT | Performed by: EMERGENCY MEDICINE

## 2025-04-29 PROCEDURE — 36415 COLL VENOUS BLD VENIPUNCTURE: CPT | Performed by: EMERGENCY MEDICINE

## 2025-04-29 PROCEDURE — 74177 CT ABD & PELVIS W/CONTRAST: CPT

## 2025-04-29 PROCEDURE — 83605 ASSAY OF LACTIC ACID: CPT | Performed by: INTERNAL MEDICINE

## 2025-04-29 PROCEDURE — 85025 COMPLETE CBC W/AUTO DIFF WBC: CPT | Performed by: EMERGENCY MEDICINE

## 2025-04-29 PROCEDURE — 93005 ELECTROCARDIOGRAM TRACING: CPT

## 2025-04-29 PROCEDURE — 99285 EMERGENCY DEPT VISIT HI MDM: CPT | Mod: 25 | Performed by: INTERNAL MEDICINE

## 2025-04-29 PROCEDURE — 2500000001 HC RX 250 WO HCPCS SELF ADMINISTERED DRUGS (ALT 637 FOR MEDICARE OP): Performed by: EMERGENCY MEDICINE

## 2025-04-29 RX ORDER — IBUPROFEN 600 MG/1
600 TABLET ORAL EVERY 6 HOURS PRN
Qty: 30 TABLET | Refills: 0 | Status: SHIPPED | OUTPATIENT
Start: 2025-04-29 | End: 2025-05-06

## 2025-04-29 RX ORDER — IBUPROFEN 600 MG/1
600 TABLET ORAL ONCE
Status: COMPLETED | OUTPATIENT
Start: 2025-04-29 | End: 2025-04-29

## 2025-04-29 RX ORDER — ACETAMINOPHEN 325 MG/1
975 TABLET ORAL ONCE
Status: COMPLETED | OUTPATIENT
Start: 2025-04-29 | End: 2025-04-29

## 2025-04-29 RX ORDER — POLYETHYLENE GLYCOL 3350 17 G/17G
17 POWDER, FOR SOLUTION ORAL 2 TIMES DAILY PRN
Qty: 510 G | Refills: 0 | Status: SHIPPED | OUTPATIENT
Start: 2025-04-29 | End: 2025-05-02

## 2025-04-29 RX ORDER — NAPROXEN SODIUM 220 MG/1
81 TABLET, FILM COATED ORAL DAILY
Qty: 360 TABLET | Refills: 0 | Status: SHIPPED | OUTPATIENT
Start: 2025-04-29 | End: 2026-04-29

## 2025-04-29 RX ORDER — ACETAMINOPHEN 325 MG/1
650 TABLET ORAL EVERY 6 HOURS PRN
Qty: 30 TABLET | Refills: 0 | Status: SHIPPED | OUTPATIENT
Start: 2025-04-29

## 2025-04-29 RX ADMIN — IOHEXOL 75 ML: 350 INJECTION, SOLUTION INTRAVENOUS at 18:42

## 2025-04-29 RX ADMIN — IBUPROFEN 600 MG: 600 TABLET, FILM COATED ORAL at 18:58

## 2025-04-29 RX ADMIN — ACETAMINOPHEN 975 MG: 325 TABLET ORAL at 18:59

## 2025-04-29 ASSESSMENT — COLUMBIA-SUICIDE SEVERITY RATING SCALE - C-SSRS
1. IN THE PAST MONTH, HAVE YOU WISHED YOU WERE DEAD OR WISHED YOU COULD GO TO SLEEP AND NOT WAKE UP?: NO
2. HAVE YOU ACTUALLY HAD ANY THOUGHTS OF KILLING YOURSELF?: NO
6. HAVE YOU EVER DONE ANYTHING, STARTED TO DO ANYTHING, OR PREPARED TO DO ANYTHING TO END YOUR LIFE?: NO

## 2025-04-29 ASSESSMENT — PAIN SCALES - GENERAL
PAINLEVEL_OUTOF10: 0 - NO PAIN
PAINLEVEL_OUTOF10: 5 - MODERATE PAIN

## 2025-04-29 ASSESSMENT — PAIN - FUNCTIONAL ASSESSMENT: PAIN_FUNCTIONAL_ASSESSMENT: 0-10

## 2025-04-29 ASSESSMENT — PAIN DESCRIPTION - LOCATION: LOCATION: ABDOMEN

## 2025-04-29 NOTE — ED TRIAGE NOTES
TRIAGE NOTE   I saw the patient as the Clinician in Triage and performed a brief history and physical exam, established acuity, and ordered appropriate tests to develop basic plan of care. Patient will be seen by an [facility], resident and/or physician who will independently evaluate the patient. Please see subsequent provider notes for further details and disposition.     Brief HPI: In brief, Elbert Tam is a 45 y.o. male that presents for left sided abdominal pain for several days associated with constipation and less flatus than usual.  Patient reports that usually he has 2 or 3 bowel movements per day.  Did not have a bowel movement yet today.  Did have a bowel movement yesterday after his wife gave him prune juice.  No associated fever or chills, incontinence or retention, dysuria or urinary frequency, vomiting, chest pain or shortness of breath.  Reports remote inguinal hernia repairs.  No current scrotal testicular or groin pain or swelling.  Patient concerned after reading sources on the Internet that he may have cancer.     Focused Physical exam:   Triage vital signs within normal limits.  Patient nontoxic-appearing.  Mild left lower quadrant tenderness without rebound or guarding.  No Shirley with normal gait.    Plan/MDM:   Tylenol Motrin for pain.  Labs including CBC, comprehensive metabolic panel and urinalysis.  Discussed with patient that I suspect most likely constipation.  Patient worried that he may have cancer and elected to proceed with CT of the abdomen pelvis.  I discussed that this has limited diagnostic ability to exclude colon cancer and that ultimately he may be best served with a nonurgent colonoscopy.    Please see subsequent provider note for further details and disposition

## 2025-04-29 NOTE — ED TRIAGE NOTES
LLQ/LUQ abdominal pain onset Friday. Reports that he has been having ongoing constipation the past week. Last BM last night. Reports that he had relief after using the bathroom last night. Denies fever/chills. Denies n/v/d

## 2025-04-29 NOTE — Clinical Note
Elbert Tam was seen and treated in our emergency department on 4/29/2025.  He may return to work on 05/05/2025.       If you have any questions or concerns, please don't hesitate to call.      Angie Zapata MD

## 2025-04-30 NOTE — ED PROVIDER NOTES
HPI     CC: Abdominal Pain     HPI: Elbert Tam is a 45 y.o. male with a history of HTN, HLD, hernia repair, presents with left-sided abdominal pain.  Symptoms started Friday, 4 days ago.  He suddenly felt left-sided abdominal pain radiating around to the left flank like a gas or bloating sensation.  He took some gas pills which did not really help.  Usually has a bowel movement 3 times a day, since the onset of symptoms has had difficulty passing bowel movements and increased left-sided abdominal pain when he tries to go.  He wondered if he may have a recurrent hernia although he has not felt any masses.  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  He denies nausea, vomiting, headache, dysuria, hematuria, or frequency.  He did have a bowel movement yesterday after drinking some prune juice but bowel movements have still been small and infrequent since.  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    ROS: 10-point review of systems was performed and is otherwise negative except as noted in HPI.    Limitations to history: N/A    Independent Historians: N/A    External Records Reviewed: Outpatient notes in EMR    Past Medical History: Noncontributory except per HPI     Past Surgical History: Noncontributory except per HPI     Family History: Reviewed and noncontributory     Social History:  Denies tobacco. Denies ETOH. Denies illicit drugs.    Social Determinants Affecting Care: N/A    RX Allergies[1]    Home Meds:   Current Outpatient Medications   Medication Instructions    aspirin 81 mg, oral, Daily    lisinopril 30 mg tablet Lisinopril TABS   Refills: 0       Active        Physical Exam     ED Triage Vitals   Temperature Heart Rate Respirations BP   04/29/25 1509 04/29/25 1509 04/29/25 1509 04/29/25 1509   36.9 °C (98.4 °F) 84 16 123/86      Pulse Ox Temp Source Heart Rate Source Patient Position   04/29/25  1509 04/29/25 1509 04/29/25 1854 --   98 % Temporal Monitor       BP Location FiO2 (%)     -- --               Heart Rate:  [84]   Temperature:  [36.9 °C (98.4 °F)]   Respirations:  [16]   BP: (123-131)/(81-90)   Weight:  [89.4 kg (197 lb)]   Pulse Ox:  [96 %-99 %]      Physical Exam  Vitals and nursing note reviewed.     CONSTITUTIONAL: Well appearing, well nourished, in no acute distress.   HENMT: Head atraumatic. Airway patent. Nasal mucosa clear. Mouth with normal mucosa, clear oropharynx. Uvula midline. Neck supple.    EYES: Clear bilaterally, pupils equally round and reactive to light.   CARDIOVASCULAR: Normal rate, regular rhythm.  Heart sounds S1, S2.  No murmurs, rubs or gallops. Normal pulses. Capillary refill < 2 sec.   RESPIRATORY: No increased work of breathing. Breath sounds clear and equal bilaterally.  GASTROINTESTINAL: Abdomen soft, non-distended, TTP LUQ. No rebound, no guarding. Normal bowel sounds. No palpable masses.  GENITOURINARY:  Mild L CVA tenderness.  MUSCULOSKELETAL: Spine appears normal, range of motion is not limited, no muscle or joint tenderness. No edema.   NEUROLOGICAL: Alert and oriented, no asymmetry, moving all extremities equally.  SKIN: Warm, dry and intact. No rash or notable lesions.  PSYCHIATRIC: Normal mood and affect.  HEME/LYMPH: No adenopathy or splenomegaly.    Diagnostic Results      ECG: ECGs read and interpreted by me. See ED Course, below, for interpretation.    Labs Reviewed   COMPREHENSIVE METABOLIC PANEL - Abnormal       Result Value    Glucose 154 (*)     Sodium 136      Potassium 4.4      Chloride 103      Bicarbonate 26      Anion Gap 11      Urea Nitrogen 18      Creatinine 1.16      eGFR 80      Calcium 10.2      Albumin 4.9      Alkaline Phosphatase 58      Total Protein 8.1      AST 24      Bilirubin, Total 0.6      ALT 38     URINALYSIS WITH REFLEX CULTURE AND MICROSCOPIC - Abnormal    Color, Urine Yellow      Appearance, Urine Clear      Specific  Gravity, Urine 1.036 (*)     pH, Urine 5.5      Protein, Urine NEGATIVE      Glucose, Urine Normal      Blood, Urine NEGATIVE      Ketones, Urine NEGATIVE      Bilirubin, Urine NEGATIVE      Urobilinogen, Urine Normal      Nitrite, Urine NEGATIVE      Leukocyte Esterase, Urine NEGATIVE     LACTATE - Normal    Lactate 1.3      Narrative:     Venipuncture immediately after or during the administration of Metamizole may lead to falsely low results. Testing should be performed immediately prior to Metamizole dosing.   SERIAL TROPONIN-INITIAL - Normal    Troponin I, High Sensitivity <3      Narrative:     Less than 99th percentile of normal range cutoff-  Female and children under 18 years old <14 ng/L; Male <21 ng/L: Negative  Repeat testing should be performed if clinically indicated.     Female and children under 18 years old 14-50 ng/L; Male 21-50 ng/L:  Consistent with possible cardiac damage and possible increased clinical   risk. Serial measurements may help to assess extent of myocardial damage.     >50 ng/L: Consistent with cardiac damage, increased clinical risk and  myocardial infarction. Serial measurements may help assess extent of   myocardial damage.      NOTE: Children less than 1 year old may have higher baseline troponin   levels and results should be interpreted in conjunction with the overall   clinical context.     NOTE: Troponin I testing is performed using a different   testing methodology at [facility] than at other   [facility]. Direct result comparisons should only   be made within the same method.   SERIAL TROPONIN, 1 HOUR - Normal    Troponin I, High Sensitivity <3      Narrative:     Less than 99th percentile of normal range cutoff-  Female and children under 18 years old <14 ng/L; Male <21 ng/L: Negative  Repeat testing should be performed if clinically indicated.     Female and children under 18 years old 14-50 ng/L; Male 21-50 ng/L:  Consistent with possible cardiac  damage and possible increased clinical   risk. Serial measurements may help to assess extent of myocardial damage.     >50 ng/L: Consistent with cardiac damage, increased clinical risk and  myocardial infarction. Serial measurements may help assess extent of   myocardial damage.      NOTE: Children less than 1 year old may have higher baseline troponin   levels and results should be interpreted in conjunction with the overall   clinical context.     NOTE: Troponin I testing is performed using a different   testing methodology at [facility] than at other   [facility]. Direct result comparisons should only   be made within the same method.   CBC WITH AUTO DIFFERENTIAL    WBC 8.2      nRBC 0.0      RBC 5.38      Hemoglobin 15.9      Hematocrit 48.6      MCV 90      MCH 29.6      MCHC 32.7      RDW 12.7      Platelets 290      Neutrophils % 54.2      Immature Granulocytes %, Automated 0.2      Lymphocytes % 37.9      Monocytes % 6.6      Eosinophils % 0.4      Basophils % 0.7      Neutrophils Absolute 4.44      Immature Granulocytes Absolute, Automated 0.02      Lymphocytes Absolute 3.11      Monocytes Absolute 0.54      Eosinophils Absolute 0.03      Basophils Absolute 0.06     URINALYSIS WITH REFLEX CULTURE AND MICROSCOPIC    Narrative:     The following orders were created for panel order Urinalysis with Reflex Culture and Microscopic.  Procedure                               Abnormality         Status                     ---------                               -----------         ------                     Urinalysis with Reflex C...[[id number]]  Abnormal            Final result               Extra Urine Gray Tube[[id number]]                                                         Please view results for these tests on the individual orders.   TROPONIN SERIES- (INITIAL, 1 HR)    Narrative:     The following orders were created for panel order Troponin I Series, High Sensitivity (0, 1  HR).  Procedure                               Abnormality         Status                     ---------                               -----------         ------                     Troponin I, High Sensiti...[[id number]]  Normal              Final result               Troponin, High Sensitivi...[[id number]]  Normal              Final result                 Please view results for these tests on the individual orders.         CT abdomen pelvis w IV contrast   Final Result   Findings consistent with an omental infarct involving the left aspect   of the omentum with an area measuring 2.6 cm x 0.9 cm.        Colonic diverticulosis without acute diverticulitis.        Severe coronary artery calcifications of the left main coronary and   lad system as well as proximal circumflex. Recommend correlation with   cardiac risk factors and follow-up cardiology is recommended.        Multiple ill-defined non simple hypodense lesions in the liver the   largest measuring 2.5 cm in segment 7 and the 2nd largest measuring   1.6 cm in segment 5. These can be seen on 09/20/2019 but size   comparison is difficult due to differing technique. Most likely these   represent hemangiomas but there are other too small to characterize   lesions as well. Recommend MRI of the liver for further evaluation as   nonemergent follow-up. YELLOW ALERT: An incidental finding alert was   sent per protocol.        MACRO:   Critical Finding:  See findings. Notification was initiated on   4/29/2025 at 7:12 pm by  Salty Corona.  (**-YCF-**) Instructions:        Signed by: Salty Corona 4/29/2025 7:12 PM   Dictation workstation:   [id number]                    [place] Coma Scale Score: 15                  Procedure  Procedures    ED Course & MDM   Assessment/Plan:   Elbert Tam is a 45 y.o. male with a history of HTN, HLD, hernia repair, presents with left-sided abdominal pain.  He is focally tender in the left upper quadrant and minimally in the left  flank.  Differential includes muscle strain, gastritis, GERD/PUD, nephrolithiasis, pyelonephritis, constipation, SBO, or other occult intra-abdominal process.  Workup was initiated with ECG, labs, CTAP.  He was given a 2 admitted for an and ibuprofen by the triage provider for his pain.  See below for details of ED course and ultimate disposition.    Medications   acetaminophen (Tylenol) tablet 975 mg (975 mg oral Given 4/29/25 1859)   ibuprofen tablet 600 mg (600 mg oral Given 4/29/25 1858)   iohexol (OMNIPaque) 350 mg iodine/mL solution 75 mL (75 mL intravenous Given 4/29/25 1842)        ED Course as of 04/29/25 2213   Tue Apr 29, 2025 2009 Labs are notable for normal CBC, CMP, urinalysis, lactate. [CG]   2010 CTAP shows omental infarct involving the left aspect of the omentum measuring 2.6 x 0.9 cm, severe coronary calcifications, multiple ill-defined nonsimple hypodense lesions in the liver likely representing hemangiomas recommending MRI of the liver/nonemergent follow-up. [CG]   2017 Spoke with general surgery Dr. Jin who advises omental infarcts are almost universally self limited and does not require intervention. Treatment is with scheduled high dose NSAIDs for 4-6 days. [CG]   2017 Given severe coronary calcifications on CT, will do ECG/serial troponin to screen for ACS as cause of LUQ abdominal pain. He was also referred to cardiology. I did discuss his incidental liver lesions as well and need for outpatient nonemergent MRI/close follow-up.  [CG]   2134 Repeat troponins are negative. [CG]   2135 ECG read and interpreted by me.  Normal sinus rhythm, rate 89.  Normal axis.  Normal intervals.  No significant ST or T wave derangements. [CG]   2135 Patient was advised of his reassuring workup and need for close follow-up as above.  He was prescribed ibuprofen 600 mg every 6 hours to be alternated with Tylenol.  He can follow-up with surgery if his symptoms do not resolve.  He was prescribed a baby aspirin  to be started after a week of ibuprofen for his CAD.  He is already on a statin.  He was referred to cardiology.  He will follow-up with his primary about his liver lesions.  He was given a work note.  He was given MiraLAX at his request for his reported constipation.  Patient was discharged home with anticipatory guidance and strict return precautions. [CG]      ED Course User Index  [CG] Angie Zapata MD         Diagnoses as of 04/29/25 2213   Coronary artery calcification   Liver lesion   Omental infarction (Multi)   Constipation, unspecified constipation type       Disposition:   DISCHARGE.  The patient was discharged with both verbal and written anticipatory guidance and strict return precautions. Discharge diagnosis, instructions and plan were discussed and understood. I emphasized the importance of following up with their primary care provider within 24-48 hours and with any referrals in the timeframe recommended. At the time of discharge the patient was comfortable and was in no apparent distress. Patient is aware of diagnostic uncertainty and was notified though testing is negative here, there is a very small chance that pathology may be missed.  The patient understands these risks and the patient/family understood to call 911 or return immediately to the emergency department if the symptoms worsen or if they have any additional concerns.      FOLLOW UP  Primary care provider in 1-2 days.      ED Prescriptions    None         Angie Zapata MD  EM/IM/Peds    This note was dictated by speech recognition. Minor errors in transcription may be present.       [1] No Known Allergies       Angie Zapata MD  04/29/25 0447

## 2025-04-30 NOTE — DISCHARGE INSTRUCTIONS
You were seen today for abdominal pain.  You are found to have an omental infarction.  This requires follow-up with a surgeon.  You can take acetaminophen (Tylenol) 650 mg every 6 hours and ibuprofen (Motrin) 600 mg every 6 hours, alternating, for pain control.  Incidentally you are found to have multiple liver lesions as well, likely hemangiomas, but please speak with your doctor soon as possible about having an MRI of the liver to be sure.  You are also found to have coronary artery calcifications on your CT which can put you at risk for heart attack.  We referred you to cardiology for further testing.  Your evaluation was not concerning for an emergency at this time. Please see the attached information sheet for information about your condition, how to care for your condition at home, and reasons to return to the emergency department. Take any prescriptions written today as prescribed. You should call your primary care provider within 24 hours to tell them about today's visit, including any new medications or medication changes, as he or she may want to see you in the office for further evaluation. If you do not have a primary care provider, call  [phone] for an appointment. We offer in-person office visits as well as virtual options. Please do not hesitate to call  [phone] or return to the emergency department with any new or unresolved concerns or symptoms. Thank you for choosing [facility] for your care.

## 2025-05-01 LAB
ATRIAL RATE: 89 BPM
P AXIS: 39 DEGREES
P OFFSET: 192 MS
P ONSET: 138 MS
PR INTERVAL: 162 MS
Q ONSET: 219 MS
QRS COUNT: 14 BEATS
QRS DURATION: 84 MS
QT INTERVAL: 330 MS
QTC CALCULATION(BAZETT): 401 MS
QTC FREDERICIA: 376 MS
R AXIS: 31 DEGREES
T AXIS: 12 DEGREES
T OFFSET: 384 MS
VENTRICULAR RATE: 89 BPM

## 2025-05-02 ENCOUNTER — OFFICE VISIT (OUTPATIENT)
Dept: CARDIOLOGY | Facility: CLINIC | Age: 45
End: 2025-05-02
Payer: COMMERCIAL

## 2025-05-02 VITALS
HEART RATE: 91 BPM | WEIGHT: 200.6 LBS | BODY MASS INDEX: 27.98 KG/M2 | OXYGEN SATURATION: 96 % | SYSTOLIC BLOOD PRESSURE: 123 MMHG | DIASTOLIC BLOOD PRESSURE: 86 MMHG

## 2025-05-02 DIAGNOSIS — I25.10 CORONARY ARTERY CALCIFICATION: Primary | ICD-10-CM

## 2025-05-02 PROCEDURE — 99203 OFFICE O/P NEW LOW 30 MIN: CPT | Performed by: HOSPITALIST

## 2025-05-02 RX ORDER — LISINOPRIL 20 MG/1
1 TABLET ORAL
COMMUNITY
Start: 2025-01-06

## 2025-05-02 RX ORDER — ROSUVASTATIN CALCIUM 20 MG/1
20 TABLET, COATED ORAL NIGHTLY
COMMUNITY

## 2025-05-02 ASSESSMENT — COLUMBIA-SUICIDE SEVERITY RATING SCALE - C-SSRS
1. IN THE PAST MONTH, HAVE YOU WISHED YOU WERE DEAD OR WISHED YOU COULD GO TO SLEEP AND NOT WAKE UP?: NO
6. HAVE YOU EVER DONE ANYTHING, STARTED TO DO ANYTHING, OR PREPARED TO DO ANYTHING TO END YOUR LIFE?: NO
2. HAVE YOU ACTUALLY HAD ANY THOUGHTS OF KILLING YOURSELF?: NO

## 2025-05-02 NOTE — PROGRESS NOTES
Subjective   Elbert Tam is a 45 y.o. male with PMH of prediabetes, who is here for evaluation of elevated coronary calcium score.  Patient works as a  and in a warehouse, he is very physically active.  He also climb 15 steps stairs at his house without any cardiovascular symptoms.  He used to drinks a lot of energy drinks and eating a lot of chips.  He has not drank alcohol since January, he never smoked.  No family history of premature CAD or heart attack.  Patient is very motivated, he wants to change his diet and have a healthier lifestyle    Patient is on aspirin 81 mg, rosuvastatin 20 mg, and lisinopril 20 mg once daily.  Today's blood pressure is 123/86, heart rate 91.    EKG from 4/29/2025, reviewed, showed sinus rhythm with nonspecific ST-T changes.    Most recent blood work from 4/29/2025 with negative troponin, creatinine 1.16, and normal hemoglobin.  A1c 6.2.    CT abdomen pelvis on 4/29/2025 with reported severe coronary artery calcifications of the left main coronary and lad system as well as proximal circumflex.    Lipid panel from 10/28/2024 with LDL of 90, HDL of 40.    Review of Systems  ROS is negative other than in HPI.      Objective   Physical Exam  General: NAD  HEENT: IEOM, PERRL   Neck: No JVD or carotid bruit  Lungs: CTAB  Heart: RRR, normal S1 and S2, no loud murmurs  Abdomen: Soft, nontender, positive bowel sounds  Extremities: No edema  Neurologic: No FND  Psychiatric: Normal mood and affect    Assessment/Plan   1-severe coronary calcification:  -Incidental finding on nondedicated CT scan from 4/29/2025  -We will order a dedicated CT coronary calcium scoring and check lipid panel.  -No indication for stress test as patient has no cardiovascular symptoms with good level of functional capacity.  -Emphasized to the patient the importance of healthy lifestyle modification and risk factors control including healthy diet, exercise regular basis, and weight loss.    2-HTN:  -  Controlled, continue lisinopril..    Tobin Flood MD

## 2025-05-02 NOTE — PATIENT INSTRUCTIONS
Thank you so much for visiting us today.    We are going to order a dedicated CT coronary calcium scoring of your heart.  And lipid panel [cholesterol numbers].  Please call us at [phone] if you do not hear from us within few days of your testing      Please do your best to exercise a regular basis, please add aerobic cardio exercises, start at 5 to 10 minutes a day, ideally 30 minutes 5 times a week.    Please do your best to lose weight through diet and exercise.  Look up Mediterranean diet.  Skip breakfast if you can,  a window of 10 hours to eat a day [10 AM to 6 PM for example], do not eat after 6 PM at night.  Avoid food rich with sugar and carbohydrates like bread, pasta, rice, and potatoes.  Count your calories and try to be 500-calorie deficient a day, this way would lose 1 pound a week [3500 sander equals to 1 pound].

## 2025-05-16 ENCOUNTER — APPOINTMENT (OUTPATIENT)
Dept: SURGERY | Facility: CLINIC | Age: 45
End: 2025-05-16
Payer: COMMERCIAL

## 2025-06-08 ENCOUNTER — HOSPITAL ENCOUNTER (OUTPATIENT)
Dept: RADIOLOGY | Facility: HOSPITAL | Age: 45
Discharge: HOME | End: 2025-06-08
Payer: COMMERCIAL

## 2025-06-08 DIAGNOSIS — I25.10 CORONARY ARTERY CALCIFICATION: ICD-10-CM

## 2025-06-08 PROCEDURE — 75571 CT HRT W/O DYE W/CA TEST: CPT

## 2025-06-11 ENCOUNTER — TELEPHONE (OUTPATIENT)
Dept: CARDIOLOGY | Facility: CLINIC | Age: 45
End: 2025-06-11
Payer: COMMERCIAL

## 2025-06-11 NOTE — TELEPHONE ENCOUNTER
CT calcium score results in chart. Patient has yet to get FLP. Left VMM regarding lab test. Will review CT with Dr. Flood and call him back with recommendations

## 2025-06-13 NOTE — TELEPHONE ENCOUNTER
Reviewed with Dr. Flood. No change to current therapy but wanted to instruct patient to notify the office immediately or seek treatment for any chest pain, CHRISTINE or other cardiac symptoms. TCT patient and left M requesting return call

## 2025-06-17 NOTE — TELEPHONE ENCOUNTER
TCT patient and CT calcium score results reviewed. Instructed to call the office with any development of cardiac symptoms. States he plays basketball regularly without any issues. Lipid panel from outside lab reviewed. Treatment plan explained in detail. All questions answered. States understanding and agreement